# Patient Record
Sex: MALE | Race: WHITE
[De-identification: names, ages, dates, MRNs, and addresses within clinical notes are randomized per-mention and may not be internally consistent; named-entity substitution may affect disease eponyms.]

---

## 2023-04-13 ENCOUNTER — HOSPITAL ENCOUNTER (INPATIENT)
Dept: HOSPITAL 46 - ED | Age: 83
LOS: 3 days | Discharge: HOME | DRG: 378 | End: 2023-04-16
Attending: INTERNAL MEDICINE | Admitting: INTERNAL MEDICINE
Payer: MEDICARE

## 2023-04-13 VITALS — SYSTOLIC BLOOD PRESSURE: 121 MMHG | DIASTOLIC BLOOD PRESSURE: 67 MMHG

## 2023-04-13 VITALS — BODY MASS INDEX: 33.77 KG/M2 | HEIGHT: 67 IN | WEIGHT: 215.17 LBS

## 2023-04-13 VITALS — DIASTOLIC BLOOD PRESSURE: 60 MMHG | SYSTOLIC BLOOD PRESSURE: 105 MMHG

## 2023-04-13 VITALS — SYSTOLIC BLOOD PRESSURE: 104 MMHG | DIASTOLIC BLOOD PRESSURE: 60 MMHG

## 2023-04-13 VITALS — SYSTOLIC BLOOD PRESSURE: 108 MMHG | DIASTOLIC BLOOD PRESSURE: 64 MMHG

## 2023-04-13 VITALS — SYSTOLIC BLOOD PRESSURE: 118 MMHG | DIASTOLIC BLOOD PRESSURE: 80 MMHG

## 2023-04-13 VITALS — SYSTOLIC BLOOD PRESSURE: 135 MMHG | DIASTOLIC BLOOD PRESSURE: 80 MMHG

## 2023-04-13 VITALS — DIASTOLIC BLOOD PRESSURE: 53 MMHG | SYSTOLIC BLOOD PRESSURE: 100 MMHG

## 2023-04-13 VITALS — DIASTOLIC BLOOD PRESSURE: 67 MMHG | SYSTOLIC BLOOD PRESSURE: 119 MMHG

## 2023-04-13 DIAGNOSIS — N40.0: ICD-10-CM

## 2023-04-13 DIAGNOSIS — Z79.899: ICD-10-CM

## 2023-04-13 DIAGNOSIS — E83.42: ICD-10-CM

## 2023-04-13 DIAGNOSIS — Z95.810: ICD-10-CM

## 2023-04-13 DIAGNOSIS — I48.20: ICD-10-CM

## 2023-04-13 DIAGNOSIS — Z90.49: ICD-10-CM

## 2023-04-13 DIAGNOSIS — D62: ICD-10-CM

## 2023-04-13 DIAGNOSIS — K57.31: Primary | ICD-10-CM

## 2023-04-13 DIAGNOSIS — I10: ICD-10-CM

## 2023-04-13 DIAGNOSIS — E87.5: ICD-10-CM

## 2023-04-13 DIAGNOSIS — Z90.89: ICD-10-CM

## 2023-04-13 DIAGNOSIS — I73.9: ICD-10-CM

## 2023-04-13 DIAGNOSIS — I50.9: ICD-10-CM

## 2023-04-13 DIAGNOSIS — I42.0: ICD-10-CM

## 2023-04-13 DIAGNOSIS — Z20.822: ICD-10-CM

## 2023-04-13 DIAGNOSIS — D68.9: ICD-10-CM

## 2023-04-13 DIAGNOSIS — K44.9: ICD-10-CM

## 2023-04-13 DIAGNOSIS — K29.50: ICD-10-CM

## 2023-04-13 DIAGNOSIS — I11.0: ICD-10-CM

## 2023-04-13 DIAGNOSIS — Z79.01: ICD-10-CM

## 2023-04-13 PROCEDURE — 30283B1 TRANSFUSION OF NONAUTOLOGOUS 4-FACTOR PROTHROMBIN COMPLEX CONCENTRATE INTO VEIN, PERCUTANEOUS APPROACH: ICD-10-PCS | Performed by: INTERNAL MEDICINE

## 2023-04-13 PROCEDURE — U0003 INFECTIOUS AGENT DETECTION BY NUCLEIC ACID (DNA OR RNA); SEVERE ACUTE RESPIRATORY SYNDROME CORONAVIRUS 2 (SARS-COV-2) (CORONAVIRUS DISEASE [COVID-19]), AMPLIFIED PROBE TECHNIQUE, MAKING USE OF HIGH THROUGHPUT TECHNOLOGIES AS DESCRIBED BY CMS-2020-01-R: HCPCS

## 2023-04-13 PROCEDURE — C9803 HOPD COVID-19 SPEC COLLECT: HCPCS

## 2023-04-13 PROCEDURE — C9113 INJ PANTOPRAZOLE SODIUM, VIA: HCPCS

## 2023-04-13 NOTE — NUR
PATIENT 1 HOUR NEB IS FINISHED INFUSING. RT AT THE BEDSIDE. PATIENTS HR HAS
REMAINED 60-80'S. PATIENT HAS SLEPT THROUGH THE 1 HOUR NEB. NO OTHER NEEDS AT
THIS TIME.

## 2023-04-13 NOTE — NUR
CALLED AND SPOKE WITH ESTHER JOE. PATIENT HAD NOT VOIDED SINCE ER. BLADDER
SCANNED . PATIENT INITIALLY DENIED THE NEED TO URINATE. PATIENT THEN UP
TO THE CAMMODE AND VOIDED AT THIS TIME. NEW ORDERS FOR 2 UNITS PRBC ON HOLD.
NEW ORDER FOR CMP TO FOLLOW POTASSIUM LEVELS. PATIENTS FAMILY AT THE BEDSIDE.
PATIENT REMAINS NPO. PATIENTS VITALS STABLE.

## 2023-04-13 NOTE — NUR
PATIENT ARRIVED TO THE CCU UNIT ROOM 129. PATIENT ABLE TO STAND AND TRANSFER.
PATIENT TO BED WITH HIS DAUGHTER LUKE AT THE BEDSIDE. ALL QUESTIONS ANSWERED.
PATIENT AND HIS DAUGHTER UPDATED ON PLAN OF CARE.

## 2023-04-13 NOTE — NUR
PATIENTS ASSESSMENT COMPLETED. PER REPORT PATIENT HAD MULTIPLE MAROON BOWEL
MOVEMNTS IN THE ED AND URINATED ONCE IN THE ER. PATIENT HAS HAD 2 BOWEL
MOVEMENTS SINCE BEING IN CCU. PATIENT VITALS REMAIN STABLE. PATIENT CONTINUES
TO DENY DIZZINESS. LABS WILL BE RECHECKED AT THIS TIME. PATIENT CONTINUES TO
DENY PAIN.

## 2023-04-13 NOTE — NUR
CONTACTED DR. SANTANA REGARDING K OF 6.8. READBACK ORDER AND DR. SANTANA CONFIRMED
[facility]

## 2023-04-13 NOTE — NUR
PATIENT LABS DRAWN WITH SOME DIFFICULTY. PATIENT WAS UP TO THE CAMMODE AND
TOLERATED WELL. NO DIZZINESS. WILL CONTINUE TO CLOSELY MONITOR.

## 2023-04-14 VITALS — SYSTOLIC BLOOD PRESSURE: 90 MMHG | DIASTOLIC BLOOD PRESSURE: 65 MMHG

## 2023-04-14 VITALS — DIASTOLIC BLOOD PRESSURE: 89 MMHG | SYSTOLIC BLOOD PRESSURE: 120 MMHG

## 2023-04-14 VITALS — SYSTOLIC BLOOD PRESSURE: 98 MMHG | DIASTOLIC BLOOD PRESSURE: 81 MMHG

## 2023-04-14 VITALS — DIASTOLIC BLOOD PRESSURE: 54 MMHG | SYSTOLIC BLOOD PRESSURE: 111 MMHG

## 2023-04-14 VITALS — SYSTOLIC BLOOD PRESSURE: 111 MMHG | DIASTOLIC BLOOD PRESSURE: 54 MMHG

## 2023-04-14 VITALS — SYSTOLIC BLOOD PRESSURE: 104 MMHG | DIASTOLIC BLOOD PRESSURE: 56 MMHG

## 2023-04-14 VITALS — DIASTOLIC BLOOD PRESSURE: 50 MMHG | SYSTOLIC BLOOD PRESSURE: 110 MMHG

## 2023-04-14 VITALS — DIASTOLIC BLOOD PRESSURE: 62 MMHG | SYSTOLIC BLOOD PRESSURE: 103 MMHG

## 2023-04-14 VITALS — DIASTOLIC BLOOD PRESSURE: 58 MMHG | SYSTOLIC BLOOD PRESSURE: 112 MMHG

## 2023-04-14 VITALS — DIASTOLIC BLOOD PRESSURE: 83 MMHG | SYSTOLIC BLOOD PRESSURE: 123 MMHG

## 2023-04-14 VITALS — DIASTOLIC BLOOD PRESSURE: 105 MMHG | SYSTOLIC BLOOD PRESSURE: 134 MMHG

## 2023-04-14 VITALS — DIASTOLIC BLOOD PRESSURE: 58 MMHG | SYSTOLIC BLOOD PRESSURE: 107 MMHG

## 2023-04-14 VITALS — DIASTOLIC BLOOD PRESSURE: 76 MMHG | SYSTOLIC BLOOD PRESSURE: 117 MMHG

## 2023-04-14 VITALS — DIASTOLIC BLOOD PRESSURE: 62 MMHG | SYSTOLIC BLOOD PRESSURE: 114 MMHG

## 2023-04-14 VITALS — SYSTOLIC BLOOD PRESSURE: 108 MMHG | DIASTOLIC BLOOD PRESSURE: 82 MMHG

## 2023-04-14 VITALS — SYSTOLIC BLOOD PRESSURE: 120 MMHG | DIASTOLIC BLOOD PRESSURE: 60 MMHG

## 2023-04-14 NOTE — NUR
PATIENT HAS A REPEAT K OF 6.1. CONTACTED DR. SANTANA AND WAS GIVEN ORDERS.
[facility] REPEAT AT 0600

## 2023-04-14 NOTE — NUR
PATIENT SHIFT REPORT RECIEVED FROM NIGHT SHIFT RN. PATIENT IS RESTING IN THE
BED AT THIS TIME. PER REPORT PATIENT HAD 4 BLOODY BOWEL MOVEMENTS OVERNOTIGHT.
PATIENT  HAS SURI LLIHT AND CALLS APPROPRIATELY.

## 2023-04-14 NOTE — NUR
PATIENT CONCERNED ABOUT HOME MEDICATION FOR HIS HEART. PATIENT STARTED ON
DIGOXIN AND WILL RECIEVE PO COREG THIS AFTERNOON. PATIENT IS STARTED ON BOWEL
PREP AND MD WANTS TO ENSURE PATIENT TOLERATES PREP WELL PRIOR TO RECIEVEING
COREG. PATIENTS ANDREZ WAS UPDATED ON PLAN OF CARE. WILL CONTINUE TO CLOSELY
MONITOR. BOWEL PREP PROVIDED TO PATIENT. PATIENT DENIES ANY OTHER NEEDS AT
THIS TIME. CALL LIGHT IN REACH.

## 2023-04-14 NOTE — NUR
PATIENT ASSESSMENT COMPLETED. PATIENTS BREATH SOUNDS CLEAR ON RA. PATIENT
DENIES SOB. NO COUGHING NOTED. PATIENT DENIESABD PAIN. ACTIVE BOWEL TONES
NOTED. NO TENDERNESS. PATIENT CONTINUES TO HAVE MAROON COLORED BOWEL
MOVEMENTS. PATIENTS HR IS IRREGULAR ON THE HEART MONITOR. MD SANTANA AT THE
BEDSIDE TO SEE PATIENT AND HOME CARDIA MEDICATIONS WILL BE ORDERED. PATIENT
HAS MAGNESIUM INFUSING FOR MAG LEVEL OF 1 ON LABS THIS AM. PATIENT IS ALERT
AND TALKATIVE. HE IS VERY PLEASANT AND FOLLOWS DIRECTIONS. PATIENT HAS A CLEAR
LIQUID TRAY AT THE BEDSIDE.  PATIENT IS NOW AT THE BEDSIDE. WILL UPDATE ON
PLAN OF CARE. CALL LIGHT IN REACH.

## 2023-04-14 NOTE — NUR
STUDENT RN AND THIS RN IN TO ASSESS PATIENT. PATIENT SITTING AT THE EDGE OF
THE BED. PATIENT ASSESSMENT REMAINS UNCHANGED. PATIENT AND STUDENT ANAMARIA
VISITING AT THIS TIME. CALL LIGHT IN REACH.

## 2023-04-14 NOTE — CONS
[facility]
                                    [address], [place]  [address]
_________________________________________________________________________________________
                                                                 Signed   
 
 
DATE OF CONSULTATION:
04/13/2023
 
CONSULTING PHYSICIAN:
Eli Linares MD.
 
REQUESTING PHYSICIAN:
Dr. Solis.
 
PROBLEM:
Hematochezia.
 
HISTORY OF PRESENT ILLNESS:
This 82-year-old white man was evaluated through the emergency room for significant
hematochezia.  Notably he was not particularly anemic with hematocrit of 41.
Concurrently, he was noted to have significant hyperkalemia with a potassium greater
than 6.  The patient has had this bleeding only for today.  He does not have routine
rectal bleeding.  He had no associated hematemesis.  He denies any significant abdominal
pain with this. 
 
Note that he underwent colonoscopy more than 20 years ago in [place].  He has no
family history of colon cancer, but is known to have diverticulosis.  It is unclear if
he has had polyps in the past, he is uncertain.  Given his significant hyperkalemia, he
has been monitored in the intensive care unit.  He has had no adverse arrhythmia or
other manifestation of his hyperkalemia.  He was treated with Kayexalate, enemas as well
as Lasix intravenously administered.  He is somewhat thirsty and would like something to
drink. 
 
SOCIAL HISTORY:
He has been  for a few years.  He has a daughter and several grandchildren living
in his home.  He lives in [place] off [address] [place] [address] (behind the [facility]). 
 
REVIEW OF SYSTEMS:
He denies any shortness of breath or chest pain.  He has had no dysphagia or dysuria.
He denies any hematemesis, but does have blood per rectum.  Additionally, the patient is
chronically anticoagulated with Coumadin related to atrial fibrillation.  He is followed
by Dr. Marina, in [place].  The patient has been on Lasix 3 times a week, lisinopril
twice daily, and spironolactone, which may contribute to his current problem
hyperkalemia.  He is considered to have low ejection fraction, CHF. 
 
PAST MEDICAL HISTORY:
Noted for hypertension, BPH, congestive heart failure with dilated cardiomyopathy,
diverticulosis, atrial fibrillation, peripheral arterial disease. 
 
 
    Electronically Signed By: ELI LINARES MD  04/14/23 1524
_________________________________________________________________________________________
PATIENT NAME:     ERICKSON CASTAÑEDA                   
MEDICAL RECORD #: [id number]            CONSULTATION                  
          ACCT #: [id number]  
DATE OF BIRTH:   [birth year]            REPORT #: [id number]      
PHYSICIAN:        ELI LINARES MD                 
PCP:              OTHER PCP                     
REPORT IS CONFIDENTIAL AND NOT TO BE RELEASED WITHOUT AUTHORIZATION
 
 
                                  [facility]
                                    [address], [place]  [address]
_________________________________________________________________________________________
                                                                 Signed   
 
 
PAST SURGICAL HISTORY:
He has had cholecystectomy, tonsillectomy, and a pacemaker with defibrillator
implantation. 
 
ALLERGIES:
He has no known drug allergies.
 
PHYSICAL EXAMINATION:
GENERAL:  A pleasant white man, who looks to be in no distress at this time. 
VITAL SIGNS:  Height is 5 feet 7 inches, weight is 98.5 kg with a BMI of 34.
Temperature is 97, pulse is 79, irregularly irregular, blood pressure 119/67, O2
saturation 99% on room air. 
HEENT:  Trachea is midline. 
CHEST:  Shows normal respiratory excursion without wheeze overtly, nor tachypnea. 
ABDOMEN:  Somewhat obese, but soft throughout.  There is no sign of ascites.  There is
no focal mass or tenderness. 
EXTREMITIES:  Show no clubbing, cyanosis, or edema at this time.
 
LABORATORY DATA:
Most recent lab studies show white count of 13.8, hematocrit 39.3, platelets 271,000.
Coag studies showed an INR of 3.88 and on admission now 1.31 after Kcentra and vitamin
K.  His electrolytes continue to show elevated potassium at 6.8 at 20:10, initial
potassium 6.6.  Creatinine is 1.67, initially 1.41.  Liver enzymes are normal.  Digoxin
level was 1.5.  Serology showed no evidence of COVID disease or other viral illness. 
 
ASSESSMENT:
The patient has had hematochezia, but without significant drop in his hemoglobin or
hematocrit.  His admission hematocrit was 41.6, now 39.3.  Colonoscopy should be
undertaken once medically optimized.  Right now, his hyperkalemia is problematic and is
being addressed by Dr. Solis, with IV fluids, Lasix, and so on. 
 
I think he could have clear liquids (water etc.) for the time being, as he is rather
thirsty.  We will make him n.p.o. in the morning. 
 
Most optimally a colonoscopy would be undertaken at the time after stabilization.  His
current regimen with Kayexalate, enemas, and other interventions may obviate the need
for formal bowel prep, but generally speaking it is most appropriate depending on the
level of fecal discharge from his apparent lower gastrointestinal bleeding.  We will
revisit that issue tomorrow morning once more stability of electrolytes has been
accomplished. 
 
 
 
            ________________________________________
 
    Electronically Signed By: ELI LINARES MD  04/14/23 1524
_________________________________________________________________________________________
PATIENT NAME:     ERICKSON CASTAÑEDA                   
MEDICAL RECORD #: [id number]            CONSULTATION                  
          ACCT #: [id number]  
DATE OF BIRTH:   [birth year]            REPORT #: [id number]      
PHYSICIAN:        ELI LINARES MD                 
PCP:              OTHER PCP                     
REPORT IS CONFIDENTIAL AND NOT TO BE RELEASED WITHOUT AUTHORIZATION
 
 
                                  [facility]
                                    [address] [place]  [address]
_________________________________________________________________________________________
                                                                 Signed   
 
 
            MD CLAUDIA Kennedy/MODL
Job #:  [id number]
DD:  04/13/2023 23:06:35
DT:  04/14/2023 00:34:04
 
cc:            Scar Giles MD
 
 
Copies:  ALIZA SOLIS MD
~
 
 
 
 
 
 
 
 
 
 
 
 
 
 
 
 
 
 
 
 
 
 
 
 
 
 
 
 
 
 
    Electronically Signed By: ELI LINARES MD  04/14/23 1524
_________________________________________________________________________________________
PATIENT NAME:     ERICKSON CASTAÑEDA                   
MEDICAL RECORD #: [id number]            CONSULTATION                  
          ACCT #: [id number]  
DATE OF BIRTH:   [birth year]            REPORT #: [id number]      
PHYSICIAN:        ELI LINARES MD                 
PCP:              OTHER PCP                     
REPORT IS CONFIDENTIAL AND NOT TO BE RELEASED WITHOUT AUTHORIZATION

## 2023-04-14 NOTE — NUR
PATIENTS FAMILY IN VISITING WITH PATIENT. PATIENT SITTING UP A TTHE EDGE OF
THE BED. PATIENT CONTINUES TO DENY DIZZINESS. VITALS STABLE. PATIENTS STOOL
CONTINUES TO BE TAN IN COLOR WITH OCC. SMALL CLOTS NOTED. STOOL IS LIQUID.
PATIENT IS STILL WORKING ON DRINKING BOWEL PREP. CALL LIGHT IN REACH.

## 2023-04-14 NOTE — NUR
PATIENT UP TO THE CAMMODE. PATIENTS FAMILY HAS BEEN IN VISITING. VITAL SIGNS
REMAIN STABLE. PATIENT DENIES ANY NEEDS AT THIS TIME. UPDATED PATIENTS FAMILY
ON PLAN OF CARE.

## 2023-04-14 NOTE — NUR
Spoke with pt and he is very pleasant.  Pt states he lives in a house with his
daughter.  They split the houses payment and household chores.  He has 3
great grandchildren living in the home ranging from 21/2 to 18. He assist with
their care and is upnights with the youngest as his daughter works.  He ses a
walking stick or a cane.  He has other DME available from his late wife.  He
denies financial concerns. He sees Dr. Gibbs for cardiology. Pt does not have
a pcp and will accept a Dr. from any clinic. He does not want a female Dr. I
will contact the physician clinic as they are the only clinic accepting pts at
this time in [facility]. Pt plans on dc to home with daughter and great
grandchildren. He denies need. I will have a scope today.

## 2023-04-14 NOTE — NUR
THIS RN IN TO CHECK ON PATIENT AND CLEAN UP CAMMODE. PATIENT RESTING IN BED
AND OPENED HIS EYES. PATIENT IS TOERLATING PREP WELL. PATIENTS STOOL IN NOW A
TAN COLOR WITH OCC. SMALL CLOTS NOTED MIXED INTO THE STOOL. PATIENT DENIES ANY
OTHER NEEDS AT THIS TIME.

## 2023-04-14 NOTE — NUR
PATIENT RESTING IN BED ON HIS SIDE. VITALS STABLE. PATIENT CONTINUES TO WAKE
UP AND WORK ON BOWEL PREP THIS AFTERNOON.

## 2023-04-14 NOTE — NUR
PATIENT CAMMODE EMPTIED AND CLEANED. PATIENT MEDICATION GIVEN. PATIENT IS
DRINKING THE LAST CUP OF HIS BOWEL PREP. PATIENT IS TOLERATING WELL. STOOL
CONTINUES TO BE TAN LIQUID WITH OCC. SMALL CLOTS IN STOOL. PATIENT DENIES ABD
PAIN AND DIZZINESS. CALL LIGHT IN REACH. PATIENT DENIES ANY OTHER NEEDS AT
THIS TIME. PATIENT HAS BEEN UP AND DOWN TO USE CAMMODE THROUGHTOUT THE DAY.
PER MD SANTANA ONCE PATIENT IS SLOWING DOWN ON GETTING UP SCD'S NEED TO BE
PLACED ON PATIENT. PATIENT NPO AT MIDNIGHT.

## 2023-04-14 NOTE — NUR
Contacted Cesar at the [facility]. I will scan the chart to her. She
states she will get a DrJalen assigned today and I let her know, he wants a male
PCP.

## 2023-04-15 VITALS — SYSTOLIC BLOOD PRESSURE: 122 MMHG | DIASTOLIC BLOOD PRESSURE: 80 MMHG

## 2023-04-15 VITALS — DIASTOLIC BLOOD PRESSURE: 68 MMHG | SYSTOLIC BLOOD PRESSURE: 99 MMHG

## 2023-04-15 VITALS — DIASTOLIC BLOOD PRESSURE: 60 MMHG | SYSTOLIC BLOOD PRESSURE: 120 MMHG

## 2023-04-15 VITALS — SYSTOLIC BLOOD PRESSURE: 111 MMHG | DIASTOLIC BLOOD PRESSURE: 63 MMHG

## 2023-04-15 VITALS — SYSTOLIC BLOOD PRESSURE: 128 MMHG | DIASTOLIC BLOOD PRESSURE: 74 MMHG

## 2023-04-15 VITALS — DIASTOLIC BLOOD PRESSURE: 88 MMHG | SYSTOLIC BLOOD PRESSURE: 143 MMHG

## 2023-04-15 VITALS — DIASTOLIC BLOOD PRESSURE: 62 MMHG | SYSTOLIC BLOOD PRESSURE: 122 MMHG

## 2023-04-15 VITALS — DIASTOLIC BLOOD PRESSURE: 60 MMHG | SYSTOLIC BLOOD PRESSURE: 143 MMHG

## 2023-04-15 VITALS — SYSTOLIC BLOOD PRESSURE: 131 MMHG | DIASTOLIC BLOOD PRESSURE: 59 MMHG

## 2023-04-15 VITALS — SYSTOLIC BLOOD PRESSURE: 138 MMHG | DIASTOLIC BLOOD PRESSURE: 66 MMHG

## 2023-04-15 PROCEDURE — 0DB68ZX EXCISION OF STOMACH, VIA NATURAL OR ARTIFICIAL OPENING ENDOSCOPIC, DIAGNOSTIC: ICD-10-PCS | Performed by: SURGERY

## 2023-04-15 PROCEDURE — 0DBN8ZX EXCISION OF SIGMOID COLON, VIA NATURAL OR ARTIFICIAL OPENING ENDOSCOPIC, DIAGNOSTIC: ICD-10-PCS | Performed by: SURGERY

## 2023-04-15 NOTE — NUR
shift report received from dayshift terese sanchez at bedside, pt awake and
resting in bed. on ra, rr even and unlabored. no distress noted. call light in
reach.

## 2023-04-15 NOTE — NUR
IN TO SEE PT AND DO ASSESSMENT. PT IS AWAKE IN BED WATCHING TV, CHEERFUL AND
TALKATIVE. DENIES ANY PAIN/NAUSEA/SOB OR WEAKNESS. HR 80'S. LUNGS CLEAR. ALERT
AND ORIENTED, DISCUSSED PLAN FOR THE DAY INCLUDING SCOPE WITH DR LINARES AND
NEED FOR NPO. PT AGREEABLE, NO NEEDS AT THIS TIME.

## 2023-04-15 NOTE — NUR
ASSESSMENT COMPLETE, SCHEDULED MEDS GIVEN-VS REMAINS WNL. pt DENEIS NAUSEA AND
PAIN. pt VERY INTERACTIVE WITH STAFF AND POLITE. IV SITES X2 WNL. EMPTIED
URINAL AND CALL LIGHT IN REACH. pt INDEPENDENT IN ROOM, STEADY ON FEET. NO
NEEDS OR CONCERNS VERBALIZED, FRESH WATER PROVIDED.

## 2023-04-15 NOTE — EKG
[facility]
                                    [address]
                                  [place], [place]  [address]
_________________________________________________________________________________________
                                                                 Signed   
 
 
Atrial fibrillation
Nonspecific intraventricular block
Abnormal ECG
No previous ECGs available
Confirmed by ALIZA SANTANA MD ([id number]) on 4/15/2023 7:30:50 AM
 
 
 
 
 
 
 
 
 
 
 
 
 
 
 
 
 
 
 
 
 
 
 
 
 
 
 
 
 
 
 
 
 
 
 
 
 
 
 
 
    Electronically Signed By: ALIZA SANTANA MD  04/15/23 0731
_________________________________________________________________________________________
PATIENT NAME:     ERICKSON CASTAÑEDA                   
MEDICAL RECORD #: [id number]                     Electrocardiogram             
          ACCT #: [id number]  
DATE OF BIRTH:   [birth year]                                       
PHYSICIAN:   ALIZA SANTANA MD                   REPORT #: [id number]
REPORT IS CONFIDENTIAL AND NOT TO BE RELEASED WITHOUT AUTHORIZATION

## 2023-04-15 NOTE — NUR
ROUNDED ON pt, pt AWAKE AND RESTING IN BED WATCHING TV. DENEIS NEEDS OR
CONCERNS. CALL LIGHT IN REACH. ON RA, RR EVEN AND UNLABORED.

## 2023-04-15 NOTE — NUR
PT VS AND I/O'S COMPLETED. PT IS LAYING DOWN IN BED. A/O, RESPIRATIONS EVEN
AND REGULAR. CALL LIGHT WITHIN REACH.

## 2023-04-15 NOTE — NUR
04/15/23 [address],[place]
1033: PATIENT AWAKENED WITH VOICE. DENIES PAIN. CRNA AT BEDSIDE.
1048: PATIENT TRANSFERRED BACK TO CCU ROOM 129. PATIENT TOLERATED
TRANSFER WELL. PATIENT WALKED FROM STRETCHER TO CCU BED. REPORT GIVEN
CCU RN.

## 2023-04-15 NOTE — NUR
PT ARRIVED TO UNIT FROM CCU VIA CHAIR. PT IS A/O, RESPIRATIONS EVEN AND
REGULAR. PT DENIES NEEDS/COMPLAINTS ATT. CALL LIGHT WITHIN REACH.

## 2023-04-16 VITALS — DIASTOLIC BLOOD PRESSURE: 59 MMHG | SYSTOLIC BLOOD PRESSURE: 120 MMHG

## 2023-04-16 VITALS — SYSTOLIC BLOOD PRESSURE: 152 MMHG | DIASTOLIC BLOOD PRESSURE: 99 MMHG

## 2023-04-16 VITALS — SYSTOLIC BLOOD PRESSURE: 163 MMHG | DIASTOLIC BLOOD PRESSURE: 100 MMHG

## 2023-04-16 VITALS — SYSTOLIC BLOOD PRESSURE: 125 MMHG | DIASTOLIC BLOOD PRESSURE: 68 MMHG

## 2023-04-16 NOTE — NUR
urinal emptied, 600mls output. daughter svetlana called and asked for update,
questions answered. no additional needs or concerns. call light in reach.

## 2023-04-16 NOTE — OR
[facility]
                                    [address], [place]  [address]
_________________________________________________________________________________________
                                                                 Signed   
 
 
DATE OF OPERATION:
04/15/2023
 
SURGEON:
Eli Linares MD
 
PREOPERATIVE DIAGNOSES:
1. Recent hematochezia and coagulopathy (warfarin related).
2. Multiple medical problems including atrial fibrillation, congestive heart failure and
cardiomyopathy. 
 
POSTOPERATIVE DIAGNOSES:
1. Extensive diverticulosis of sigmoid and more proximal colon. No evidence of active
blood or bleeding. 
2. Polyp at rectosigmoid colon (excised).
3. Hiatal hernia and possible mild gastritis.
 
PROCEDURES:
1. Total colonoscopy to cecum with cold snare polypectomy x1.
2. Upper endoscopy with biopsy of proximal stomach.
 
ANESTHESIA:
Jake Sandifer, CRNA
 
INDICATIONS:
This 82-year-old white man was admitted by Dr. Solis on April 13, 2023 with significant
hematochezia and hematocrit noted to be 27.  He has been chronically anticoagulated with
Coumadin for chronic atrial fibrillation.  He has numerous cardiovascular comorbidities.
 He has been reversed on his anticoagulation and at this point, hematochezia has ceased.
 He has undergone a bowel prep yesterday.  He is admitted to undergo colonoscopy to
better characterize the source of his hematochezia and if no findings entirely
accounting for the process, upper endoscopy as well.  He understands the risks of
bleeding, infection, and perforation and wished to proceed. 
 
FINDINGS:
Colonoscopy showed a perfectly prepped colon.  Complete colonoscopy was undertaken to
the cecum without question.  There were numerous large diverticula of the sigmoid and
left colon and scattered diverticula more proximally.  There was an adenomatous polyp at
the rectosigmoid which was excised with cold snare technique. 
 
On the basis of those findings without signs of active bleeding, he did undergo upper
endoscopy.  This showed only a hiatal hernia and mild proximal gastritis but no sign of
 
    Electronically Signed By: ELI LINARES MD  04/16/23 1302
_________________________________________________________________________________________
PATIENT NAME:     ERICKSON CASTAÑEDA                   
MEDICAL RECORD #: [id number]            OPERATIVE REPORT              
          ACCT #: [id number]  
DATE OF BIRTH:   [birth year]            REPORT #: [id number]      
PHYSICIAN:        ELI LINARES MD                 
PCP:              OTHER PCP                     
REPORT IS CONFIDENTIAL AND NOT TO BE RELEASED WITHOUT AUTHORIZATION
 
 
                                  [facility]
                                    [address], [place]  [address]
_________________________________________________________________________________________
                                                                 Signed   
 
 
ulcer or neoplasm. 
 
DESCRIPTION OF PROCEDURE:
The patient was brought to the endoscopy suite and placed in the lateral decubitus
position and given intravenous sedation with propofol infusional technique.  Digital
rectal examination was performed which was normal. 
 
An Olympus video colonoscope was passed in the rectum and manipulated throughout the
colon noting numerous diverticula of the sigmoid and more proximally as well.  The scope
was ultimately advanced to the cecum.  The ileocecal valve and appendiceal orifice were
noted to be normal.  There was no sign of blood in the GI tract.  No sign of
inflammation, ischemic colitis, or other finding that would account for hematochezia,
only diverticulosis.  The scope was withdrawn from the cecum and examination throughout
showed diverticulitis noted and at the rectosigmoid, an adenomatous polyp which was
reasonably small.  This was excised with cold snare polypectomy technique.  The rectum
was otherwise normal.  On that basis, upper endoscopy was considered appropriate.  The
Olympus video upper endoscope was passed into the hypopharynx after placement of a bite
block.  Vocal cords appeared normal.  Scope was advanced to the esophagus, throughout
its length it was normal.  Scope was advanced to the stomach, which was insufflated with
air.  There was no sign of blood or other abnormality at that point.  Rugal folds were
normal.  Antrum was normal as was the pylorus.  The scope was passed through the pylorus
into the duodenum, which was also normal.  Scope was withdrawn and retroflexed view
undertaken showing proximal gastritis but no sign of ulceration or any sign of recent
bleeding.  Hiatal hernia was also noted.  Biopsies were obtained in the proximal stomach
for both GILMER and pathologic testing.  The scope was then withdrawn to the esophagus.
The remaining esophagus appeared normal.  The patient was taken to the recovery room in
good condition. 
 
CONCLUDING DIAGNOSIS:
Most likely GI bleeding related to diverticulosis in the setting of anticoagulation.
 
PLAN:
He will return to the care of Dr. Solis and I would recommend advancement of his diet.
He may benefit from PPI medication, particularly if he will be returning to
anticoagulation regimen. 
 
 
 
            ________________________________________
            Eli Linares MD 
 
 
 
    Electronically Signed By: ELI LINARES MD  04/16/23 1302
_________________________________________________________________________________________
PATIENT NAME:     ERICKSON CASTAÑEDA                   
MEDICAL RECORD #: [id number]            OPERATIVE REPORT              
          ACCT #: [id number]  
DATE OF BIRTH:   [birth year]            REPORT #: [id number]      
PHYSICIAN:        ELI LINARES MD                 
PCP:              OTHER PCP                     
REPORT IS CONFIDENTIAL AND NOT TO BE RELEASED WITHOUT AUTHORIZATION
 
 
                                  [facility]
                                    [address]
                                  [place], [place]  [address]
_________________________________________________________________________________________
                                                                 Signed   
 
 
/MODL
Job #:  [id number]
DD:  04/15/2023 10:30:50
DT:  04/15/2023 11:08:41
 
cc:            Dr. Salas Solis MD
 
 
Copies:  ALIZA SOLIS MD
~
 
 
 
 
 
 
 
 
 
 
 
 
 
 
 
 
 
 
 
 
 
 
 
 
 
 
 
 
 
 
 
    Electronically Signed By: ELI LINARES MD  04/16/23 1302
_________________________________________________________________________________________
PATIENT NAME:     ERICKSON CASTAÑEDA                   
MEDICAL RECORD #: [id number]            OPERATIVE REPORT              
          ACCT #: [id number]  
DATE OF BIRTH:   [birth year]            REPORT #: [id number]      
PHYSICIAN:        ELI LINARES MD                 
PCP:              OTHER PCP                     
REPORT IS CONFIDENTIAL AND NOT TO BE RELEASED WITHOUT AUTHORIZATION

## 2023-04-16 NOTE — NUR
RECEIVED REPORT FROM NOC NURSE. PT IS A/O, RESPIRATIONS EVEN AND REGULAR.
DENIES NEEDS/COMPLAINTS ATT.

## 2023-04-16 NOTE — NUR
vss, pt awoke to voice. no acute changes to assessment. tele remains in place,
afib. call light in reach.

## 2023-04-16 NOTE — NUR
cna in room collecting vs, no needs or concerns verbalized. call light in
reach. pt remains on ra, rr even and unlabored.

## 2023-04-16 NOTE — NUR
PT ASSESSMENT AND MEDICATION ADMINISTRATION COMPLETED. PT REQUESTED ONE IV TO
BE REMOVED. WILLING TO KEEP ONE FOR IV MAG REPLACEMENT. BP ELEVATED 163/100,
REFUSES RECHECK. STATES HE IS VERY WORKED UP ABOUT WAITING TO BE DISCHARGED.
RECEIVED BP MEDICATIONS. CALL LIGHT WITHIN REACH.

## 2024-03-19 ENCOUNTER — HOSPITAL ENCOUNTER (EMERGENCY)
Dept: HOSPITAL 46 - ED | Age: 84
Discharge: HOME | End: 2024-03-19
Payer: MEDICARE

## 2024-03-19 VITALS — DIASTOLIC BLOOD PRESSURE: 107 MMHG | SYSTOLIC BLOOD PRESSURE: 149 MMHG

## 2024-03-19 VITALS — WEIGHT: 221.56 LBS | HEIGHT: 67 IN | BODY MASS INDEX: 34.78 KG/M2

## 2024-03-19 DIAGNOSIS — Z79.01: ICD-10-CM

## 2024-03-19 DIAGNOSIS — I50.9: ICD-10-CM
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
## 2024-03-19 DIAGNOSIS — Z79.899: ICD-10-CM

## 2024-03-19 DIAGNOSIS — S01.01XA: Primary | ICD-10-CM

## 2024-03-19 DIAGNOSIS — E78.5: ICD-10-CM

## 2024-03-19 DIAGNOSIS — I48.91: ICD-10-CM

## 2024-03-19 DIAGNOSIS — I11.0: ICD-10-CM

## 2024-03-19 LAB
ABO GROUP BLD: (no result)
ALBUMIN SERPL-MCNC: 3.6 G/DL (ref 3.4–5)
ALBUMIN/GLOB SERPL: 1 {RATIO} (ref 1.1–2.4)
ALP SERPL-CCNC: 75 U/L (ref 46–116)
ALT SERPL W P-5'-P-CCNC: 25 U/L (ref 14–59)
ANION GAP SERPL CALCULATED.4IONS-SCNC: 14.1 MMOL/L (ref 7–21)
AST SERPL-CCNC: 23 U/L (ref 15–37)
BASOPHILS NFR BLD AUTO: 0.8 % (ref 0–2)
BUN SERPL-MCNC: 24 MG/DL (ref 7–18)
BUN/CREAT SERPL: 21.42 (ref 6–28.6)
CALCIUM SERPL-MCNC: 8.2 MG/DL (ref 8.5–10.1)
CHLORIDE SERPL-SCNC: 105 MMOL/L (ref 98–107)
CO2 SERPL-SCNC: 24 MMOL/L (ref 21–32)
DEPRECATED RDW RBC AUTO: 16.9 FL (ref 10.5–15)
EGFRCR SERPLBLD CKD-EPI 2021: 66 ML/MIN (ref 60–?)
EOSINOPHIL NFR BLD AUTO: 1.8 % (ref 0–6)
ETHANOL SERPL-SCNC: <3 NG/DL (ref ?–3)
GLOBULIN SER-MCNC: 3.6 G/DL (ref 1.8–3.5)
HCT VFR BLD AUTO: 43.6 % (ref 35–50)
HGB BLD-MCNC: 14.3 G/DL (ref 12–18)
IAT POLY-SP REAG SERPL QL: NEGATIVE
LYMPHOCYTES NFR BLD AUTO: 13.5 % (ref 24–44)
MCH RBC QN AUTO: 30.3 PG (ref 27–36)
MCHC RBC AUTO-ENTMCNC: 32.8 G/DL (ref 30–36)
MCV RBC AUTO: 92.6 FL (ref 81–99)
MONOCYTES NFR BLD AUTO: 9.3 % (ref 0–12)
NEUTROPHILS NFR BLD AUTO: 74.6 % (ref 39–80)
PLATELET # BLD AUTO: 189 K/UL (ref 140–440)
POTASSIUM SERPL-SCNC: 4.1 MMOL/L (ref 3.5–5.1)
PROT SERPL-MCNC: 7.2 G/DL (ref 6.4–8.2)
RBC # BLD AUTO: 4.71 M/UL (ref 4.3–5.7)
RH BLD: POSITIVE
TRANSF BAND NUM PATIENT: (no result)

## 2024-03-19 PROCEDURE — G0480 DRUG TEST DEF 1-7 CLASSES: HCPCS
